# Patient Record
Sex: MALE | Race: WHITE | NOT HISPANIC OR LATINO | Employment: FULL TIME | URBAN - METROPOLITAN AREA
[De-identification: names, ages, dates, MRNs, and addresses within clinical notes are randomized per-mention and may not be internally consistent; named-entity substitution may affect disease eponyms.]

---

## 2018-05-02 ENCOUNTER — OFFICE VISIT CONVERTED (OUTPATIENT)
Dept: FAMILY MEDICINE CLINIC | Facility: CLINIC | Age: 33
End: 2018-05-02
Attending: NURSE PRACTITIONER

## 2018-05-15 ENCOUNTER — OFFICE VISIT CONVERTED (OUTPATIENT)
Dept: NEUROSURGERY | Facility: CLINIC | Age: 33
End: 2018-05-15
Attending: PHYSICIAN ASSISTANT

## 2021-05-16 VITALS
BODY MASS INDEX: 26.64 KG/M2 | WEIGHT: 201 LBS | SYSTOLIC BLOOD PRESSURE: 144 MMHG | HEART RATE: 73 BPM | DIASTOLIC BLOOD PRESSURE: 73 MMHG | HEIGHT: 73 IN

## 2021-05-16 VITALS
WEIGHT: 200.37 LBS | BODY MASS INDEX: 26.56 KG/M2 | HEART RATE: 55 BPM | HEIGHT: 73 IN | OXYGEN SATURATION: 97 % | SYSTOLIC BLOOD PRESSURE: 145 MMHG | DIASTOLIC BLOOD PRESSURE: 88 MMHG

## 2022-08-15 ENCOUNTER — APPOINTMENT (OUTPATIENT)
Dept: CT IMAGING | Facility: HOSPITAL | Age: 37
End: 2022-08-15

## 2022-08-15 ENCOUNTER — HOSPITAL ENCOUNTER (EMERGENCY)
Facility: HOSPITAL | Age: 37
Discharge: HOME | End: 2022-08-15
Attending: EMERGENCY MEDICINE | Admitting: EMERGENCY MEDICINE

## 2022-08-15 ENCOUNTER — APPOINTMENT (OUTPATIENT)
Dept: GENERAL RADIOLOGY | Facility: HOSPITAL | Age: 37
End: 2022-08-15

## 2022-08-15 VITALS
TEMPERATURE: 98 F | OXYGEN SATURATION: 95 % | HEART RATE: 73 BPM | SYSTOLIC BLOOD PRESSURE: 123 MMHG | DIASTOLIC BLOOD PRESSURE: 79 MMHG | RESPIRATION RATE: 16 BRPM

## 2022-08-15 DIAGNOSIS — S20.211A CONTUSION OF RIGHT CHEST WALL, INITIAL ENCOUNTER: Primary | ICD-10-CM

## 2022-08-15 DIAGNOSIS — S22.31XA CLOSED FRACTURE OF ONE RIB OF RIGHT SIDE, INITIAL ENCOUNTER: ICD-10-CM

## 2022-08-15 DIAGNOSIS — S46.911A STRAIN OF RIGHT ELBOW, INITIAL ENCOUNTER: ICD-10-CM

## 2022-08-15 LAB
HOLD SPECIMEN: NORMAL
WHOLE BLOOD HOLD COAG: NORMAL
WHOLE BLOOD HOLD SPECIMEN: NORMAL

## 2022-08-15 PROCEDURE — 74176 CT ABD & PELVIS W/O CONTRAST: CPT

## 2022-08-15 PROCEDURE — 71250 CT THORAX DX C-: CPT

## 2022-08-15 PROCEDURE — 25010000002 ONDANSETRON PER 1 MG: Performed by: EMERGENCY MEDICINE

## 2022-08-15 PROCEDURE — 73070 X-RAY EXAM OF ELBOW: CPT

## 2022-08-15 PROCEDURE — 96374 THER/PROPH/DIAG INJ IV PUSH: CPT

## 2022-08-15 PROCEDURE — 25010000002 HYDROMORPHONE PER 4 MG: Performed by: EMERGENCY MEDICINE

## 2022-08-15 PROCEDURE — 99284 EMERGENCY DEPT VISIT MOD MDM: CPT

## 2022-08-15 PROCEDURE — 96375 TX/PRO/DX INJ NEW DRUG ADDON: CPT

## 2022-08-15 PROCEDURE — 73060 X-RAY EXAM OF HUMERUS: CPT

## 2022-08-15 RX ORDER — OXYCODONE HYDROCHLORIDE AND ACETAMINOPHEN 5; 325 MG/1; MG/1
1 TABLET ORAL ONCE
Status: COMPLETED | OUTPATIENT
Start: 2022-08-15 | End: 2022-08-15

## 2022-08-15 RX ORDER — ONDANSETRON 4 MG/1
4 TABLET, ORALLY DISINTEGRATING ORAL 4 TIMES DAILY PRN
Qty: 15 TABLET | Refills: 0 | Status: SHIPPED | OUTPATIENT
Start: 2022-08-15

## 2022-08-15 RX ORDER — OXYCODONE HYDROCHLORIDE AND ACETAMINOPHEN 5; 325 MG/1; MG/1
1 TABLET ORAL EVERY 6 HOURS PRN
Qty: 15 TABLET | Refills: 0 | Status: SHIPPED | OUTPATIENT
Start: 2022-08-15

## 2022-08-15 RX ORDER — HYDROMORPHONE HYDROCHLORIDE 1 MG/ML
0.5 INJECTION, SOLUTION INTRAMUSCULAR; INTRAVENOUS; SUBCUTANEOUS ONCE
Status: COMPLETED | OUTPATIENT
Start: 2022-08-15 | End: 2022-08-15

## 2022-08-15 RX ORDER — OXYCODONE HYDROCHLORIDE AND ACETAMINOPHEN 5; 325 MG/1; MG/1
1 TABLET ORAL EVERY 6 HOURS PRN
Qty: 15 TABLET | Refills: 0 | Status: SHIPPED | OUTPATIENT
Start: 2022-08-15 | End: 2022-08-15 | Stop reason: SDUPTHER

## 2022-08-15 RX ORDER — SODIUM CHLORIDE 0.9 % (FLUSH) 0.9 %
10 SYRINGE (ML) INJECTION AS NEEDED
Status: DISCONTINUED | OUTPATIENT
Start: 2022-08-15 | End: 2022-08-15 | Stop reason: HOSPADM

## 2022-08-15 RX ORDER — ONDANSETRON 2 MG/ML
4 INJECTION INTRAMUSCULAR; INTRAVENOUS ONCE
Status: COMPLETED | OUTPATIENT
Start: 2022-08-15 | End: 2022-08-15

## 2022-08-15 RX ADMIN — OXYCODONE AND ACETAMINOPHEN 1 TABLET: 5; 325 TABLET ORAL at 13:06

## 2022-08-15 RX ADMIN — ONDANSETRON 4 MG: 2 INJECTION INTRAMUSCULAR; INTRAVENOUS at 11:10

## 2022-08-15 RX ADMIN — HYDROMORPHONE HYDROCHLORIDE 0.5 MG: 1 INJECTION, SOLUTION INTRAMUSCULAR; INTRAVENOUS; SUBCUTANEOUS at 11:10

## 2022-08-15 RX ADMIN — Medication 10 ML: at 10:05

## 2022-08-15 NOTE — ED NOTES
Pt to ED from work s/p insulation fell on his R side, having pain to posterior R upper back, R elbow, and back of R upper arm, unsure of LOC but denies neck or head pain. No bruising noted to torso arm or back at this time. Denies CP. States may have been a significant amount of weight that fell on him     Pt wearing face mask during their stay in ER. This RN wore appropriate ppe while providing patient care.

## 2022-08-15 NOTE — ED PROVIDER NOTES
EMERGENCY DEPARTMENT ENCOUNTER    Room Number:  20/20  PCP: Provider, No Known  Historian: Patient  History Limited By: Nothing      HPI  Chief Complaint: Chest and abdominal injury  Context: Perez Vann is a 37 y.o. male who presents to the ED c/o chest and abdominal injury.  Patient states he works in [place].  States fiberglass fell as he was bent over.  Patient states it fell into his right rib cage and then he fell onto his right elbow.  Patient states he did not hit his head.  Has no headache neck pain vomiting.  Patient has no fevers.  Patient has no pain in his hips or lower extremities.      Location: Right chest and right abdomen.  Right elbow  Radiation: None  Character: Aching  Duration: 8:30 AM  Severity: Severe  Progression: Not improving  Aggravating Factors: Movement  Alleviating Factors: Remaining still        MEDICAL RECORD REVIEW    No prior records in [facility]          PAST MEDICAL HISTORY  Active Ambulatory Problems     Diagnosis Date Noted   • No Active Ambulatory Problems     Resolved Ambulatory Problems     Diagnosis Date Noted   • No Resolved Ambulatory Problems     Past Medical History:   Diagnosis Date   • Thoracic compression fracture (HCC)          PAST SURGICAL HISTORY  History reviewed. No pertinent surgical history.      FAMILY HISTORY  History reviewed. No pertinent family history.      SOCIAL HISTORY  Social History     Socioeconomic History   • Marital status:    Tobacco Use   • Smoking status: Unknown If Ever Smoked   Substance and Sexual Activity   • Alcohol use: Defer   • Drug use: Defer   • Sexual activity: Defer         ALLERGIES  Patient has no known allergies.        REVIEW OF SYSTEMS  Review of Systems   Constitutional: Negative for activity change, appetite change and fever.   HENT: Negative for congestion and sore throat.    Eyes: Negative.    Respiratory: Negative for cough and shortness of breath.    Cardiovascular: Positive for chest pain.  Negative for leg swelling.   Gastrointestinal: Positive for abdominal pain. Negative for diarrhea and vomiting.   Endocrine: Negative.    Genitourinary: Negative for decreased urine volume and dysuria.   Musculoskeletal: Positive for arthralgias. Negative for neck pain.   Skin: Negative for rash and wound.   Allergic/Immunologic: Negative.    Neurological: Negative for weakness, numbness and headaches.   Hematological: Negative.    Psychiatric/Behavioral: Negative.    All other systems reviewed and are negative.           PHYSICAL EXAM  ED Triage Vitals   Temp Heart Rate Resp BP SpO2   08/15/22 0916 08/15/22 0916 08/15/22 0916 08/15/22 0916 08/15/22 0916   98 °F (36.7 °C) 60 16 140/90 99 %      Temp src Heart Rate Source Patient Position BP Location FiO2 (%)   -- 08/15/22 1016 -- -- --    Monitor          Physical Exam  Vitals and nursing note reviewed.   Constitutional:       General: He is not in acute distress.  HENT:      Head: Normocephalic and atraumatic.   Eyes:      Pupils: Pupils are equal, round, and reactive to light.   Cardiovascular:      Rate and Rhythm: Normal rate and regular rhythm.      Heart sounds: Normal heart sounds.   Pulmonary:      Effort: Pulmonary effort is normal. No respiratory distress.      Breath sounds: Normal breath sounds.   Chest:      Chest wall: Tenderness present.   Abdominal:      Palpations: Abdomen is soft.      Tenderness: There is abdominal tenderness. There is no guarding or rebound.   Musculoskeletal:         General: Normal range of motion.      Cervical back: Normal range of motion and neck supple.      Comments: Tenderness to right distal humerus and elbow   Skin:     General: Skin is warm and dry.   Neurological:      Mental Status: He is alert and oriented to person, place, and time.      Sensory: Sensation is intact. No sensory deficit.      Motor: No weakness.   Psychiatric:         Mood and Affect: Mood and affect normal.       Patient was wearing a face mask  when I entered the room and they continued to wear a mask throughout their stay in the ED.  I wore PPE, including a gown, gloves, face mask with shield or face mask with goggles whenever I was in the room with patient. My scribe was not in the room.      LAB RESULTS  Recent Results (from the past 24 hour(s))   Green Top (Gel)    Collection Time: 08/15/22 10:05 AM   Result Value Ref Range    Extra Tube Hold for add-ons.    Lavender Top    Collection Time: 08/15/22 10:05 AM   Result Value Ref Range    Extra Tube hold for add-on    Light Blue Top    Collection Time: 08/15/22 10:05 AM   Result Value Ref Range    Extra Tube Hold for add-ons.        Ordered the above labs and reviewed the results.        RADIOLOGY  CT Chest Without Contrast Diagnostic   Final Result   Impression:   1.  Asymmetric swelling of the right pectoralis musculature suggestive   of underlying intramuscular contusion and/or hematoma which is overall   not well evaluated without intravenous contrast.   2.  Right 10th rib fracture posteriorly concerning for a nondisplaced   acute fracture. Subtle adjacent pleural thickening and/or trace effusion   is present. No pneumothorax is seen.   3.  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 Bilateral pars defects at L5-S1 without   significant listhesis at this time.   4.  0.9 cm pulmonary nodule within the right middle lobe with apparent   foci of calcification and surrounding satellite nodularity. While   findings are suggestive of evolving granulomatous disease, they remain   indeterminate and continued attention on follow-up with chest CT in 3   months is recommended to ensure stability and/or appropriate evolution.   5.  Area of tree-in-bud nodularity within the right middle lobe is   likely reactive and correlation with patient history is recommended to   exclude acute atypical pneumonia.   6.  Other findings as above.       This report was finalized on  8/15/2022 12:40 PM by Dr. Donald Reese M.D.          CT Abdomen Pelvis Without Contrast   Final Result   Impression:   1.  Asymmetric swelling of the right pectoralis musculature suggestive   of underlying intramuscular contusion and/or hematoma which is overall   not well evaluated without intravenous contrast.   2.  Right 10th rib fracture posteriorly concerning for a nondisplaced   acute fracture. Subtle adjacent pleural thickening and/or trace effusion   is present. No pneumothorax is seen.   3.  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 Bilateral pars defects at L5-S1 without   significant listhesis at this time.   4.  0.9 cm pulmonary nodule within the right middle lobe with apparent   foci of calcification and surrounding satellite nodularity. While   findings are suggestive of evolving granulomatous disease, they remain   indeterminate and continued attention on follow-up with chest CT in 3   months is recommended to ensure stability and/or appropriate evolution.   5.  Area of tree-in-bud nodularity within the right middle lobe is   likely reactive and correlation with patient history is recommended to   exclude acute atypical pneumonia.   6.  Other findings as above.       This report was finalized on 8/15/2022 12:40 PM by Dr. Donald Reese M.D.          XR Humerus Right   Final Result      XR Elbow 2 View Right   Final Result           Ordered the above noted radiological studies. Reviewed by me in PACS.           PROCEDURES  Procedures              MEDICATIONS GIVEN IN ER  Medications   sodium chloride 0.9 % flush 10 mL (10 mL Intravenous Given 8/15/22 1005)   HYDROmorphone (DILAUDID) injection 0.5 mg (0.5 mg Intravenous Given 8/15/22 1110)   ondansetron (ZOFRAN) injection 4 mg (4 mg Intravenous Given 8/15/22 1110)   oxyCODONE-acetaminophen (PERCOCET) 5-325 MG per tablet 1 tablet (1 tablet Oral Given 8/15/22 1306)             PROGRESS AND  CONSULTS  ED Course as of 08/15/22 1423   Mon Aug 15, 2022   1253 12:53 EDT  Patient presents for evaluation after injury.  Patient's elbow does not appear broken.  He is tender to there may be soft tissue injury.  Will place in a sling and will refer to orthopedic.  Patient's chest and abdomen are from a chest wall contusion as well as 10th rib fracture.  Patient will give incentive spirometer.  Will be given pain medication.  He is to follow-up with Workmen's Comp.  Instructed to return here for worsening symptoms. [SL]      ED Course User Index  [SL] Tu Hamilton MD           MEDICAL DECISION MAKING      MDM  Number of Diagnoses or Management Options     Amount and/or Complexity of Data Reviewed  Tests in the radiology section of CPT®: reviewed and ordered (CT chest shows rib fracture)               DIAGNOSIS  Final diagnoses:   Contusion of right chest wall, initial encounter   Strain of right elbow, initial encounter   Closed fracture of one rib of right side, initial encounter           DISPOSITION  DISCHARGE    Patient discharged in stable condition.    Reviewed implications of results, diagnosis, meds, responsibility to follow up, warning signs and symptoms of possible worsening, potential complications and reasons to return to ER, including worsening symptoms    Patient/Family voiced understanding of above instructions.    Discussed plan for discharge, as there is no emergent indication for admission. Patient referred to primary care provider for BP management due to today's BP. Pt/family is agreeable and understands need for follow up and repeat testing.  Pt is aware that discharge does not mean that nothing is wrong but it indicates no emergency is present that requires admission and they must continue care with follow-up as given below or physician of their choice.     FOLLOW-UP  PATIENT CONNECTION - [facility] [id number]  [phone]  Schedule an appointment as soon as possible  for a visit       Wander Shah MD  [address] [facility] [address]  [place] [id number] [phone]    Schedule an appointment as soon as possible for a visit            Medication List      New Prescriptions    ondansetron ODT 4 MG disintegrating tablet  Commonly known as: ZOFRAN-ODT  Place 1 tablet on the tongue 4 (Four) Times a Day As Needed for Nausea or Vomiting.     oxyCODONE-acetaminophen 5-325 MG per tablet  Commonly known as: PERCOCET  Take 1 tablet by mouth Every 6 (Six) Hours As Needed for Moderate Pain .           Where to Get Your Medications      These medications were sent to [facility] - [place], [place] - [address] AT [facility] [address] - [phone]  - [phone] FX  [address], [place] [address]    Phone: [phone]   · oxyCODONE-acetaminophen 5-325 MG per tablet     You can get these medications from any pharmacy    Bring a paper prescription for each of these medications  · ondansetron ODT 4 MG disintegrating tablet             Latest Documented Vital Signs:  As of 14:23 EDT  BP- 123/79 HR- 73 Temp- 98 °F (36.7 °C) O2 sat- 95%                         Tu Hamilton MD  08/15/22 [id number]

## 2022-08-15 NOTE — ED NOTES
Patient was at work today and had some insulation fall on him. He is having pain on his right side, on his right shoulder and side area. He states his elbow also hurts. Patient picked up by ems, has on  Mask and nurse has on proper ppe

## 2022-08-15 NOTE — ED NOTES
Pt verbalized understanding and was able to demonstrate success use of incentive spirometer at this time. Pt denies any questions and educated on importance of use after incentive spirometer.

## 2023-03-27 ENCOUNTER — HOSPITAL ENCOUNTER (EMERGENCY)
Facility: HOSPITAL | Age: 38
Discharge: LEFT BEFORE DISCHARGE | End: 2023-03-27
Admitting: EMERGENCY MEDICINE
Payer: COMMERCIAL

## 2023-03-27 VITALS
WEIGHT: 218.48 LBS | HEIGHT: 73 IN | HEART RATE: 74 BPM | RESPIRATION RATE: 16 BRPM | OXYGEN SATURATION: 96 % | SYSTOLIC BLOOD PRESSURE: 141 MMHG | DIASTOLIC BLOOD PRESSURE: 89 MMHG | BODY MASS INDEX: 28.96 KG/M2 | TEMPERATURE: 98.1 F

## 2023-03-27 PROCEDURE — 99281 EMR DPT VST MAYX REQ PHY/QHP: CPT

## 2023-04-04 NOTE — ED PROVIDER NOTES
"Time: 7:05 AM EDT  Date of encounter:  3/27/2023  Independent Historian/Clinical History and Information was obtained by:   Patient  Chief Complaint   Patient presents with   • Laceration       History is limited by: N/A    History of Present Illness:  Patient is a 37 y.o. year old male who presents to the emergency department for evaluation of a laceration to his left eyebrow after hitting himself with a wooden post.  He did not lose consciousness.  There is no active bleeding.  This occurred 1 hour ago and the patient used liquid Band-Aid.    HPI    Patient Care Team  Primary Care Provider: Provider, Gege Known    Past Medical History:     No Known Allergies  Past Medical History:   Diagnosis Date   • Thoracic compression fracture     \"birth defect, looks like its crushed\"     No past surgical history on file.  No family history on file.    Home Medications:  Prior to Admission medications    Medication Sig Start Date End Date Taking? Authorizing Provider   IBUPROFEN IB PO Take  by mouth.    Provider, MD Gladys   ondansetron ODT (ZOFRAN-ODT) 4 MG disintegrating tablet Place 1 tablet on the tongue 4 (Four) Times a Day As Needed for Nausea or Vomiting. 8/15/22   Tu Hamilton MD   oxyCODONE-acetaminophen (PERCOCET) 5-325 MG per tablet Take 1 tablet by mouth Every 6 (Six) Hours As Needed for Moderate Pain . 8/15/22   Tu Hamilton MD        Social History:   Social History     Tobacco Use   • Smoking status: Unknown   Substance Use Topics   • Alcohol use: Defer   • Drug use: Defer         Review of Systems:  Review of Systems   Skin: Positive for wound.        Physical Exam:  /89 (Patient Position: Sitting)   Pulse 74   Temp 98.1 °F (36.7 °C) (Oral)   Resp 16   Ht 185.4 cm (73\")   Wt 99.1 kg (218 lb 7.6 oz)   SpO2 96%   BMI 28.82 kg/m²     Physical Exam  HENT:      Head: Laceration present.                    Procedures:  Procedures      Medical Decision Making:      Comorbidities that " affect care:    None    External Notes reviewed:    None      The following orders were placed and all results were independently analyzed by me:  No orders of the defined types were placed in this encounter.      Medications Given in the Emergency Department:  Medications - No data to display     ED Course:    The patient was initially evaluated in the triage area where orders were placed. The patient was later dispositioned by CARINA Singh.      The patient was advised to stay for completion of workup which includes but is not limited to communication of labs and radiological results, reassessment and plan. The patient was advised that leaving prior to disposition by a provider could result in critical findings that are not communicated to the patient.          Labs:    Lab Results (last 24 hours)     ** No results found for the last 24 hours. **           Imaging:    No Radiology Exams Resulted Within Past 24 Hours      Differential Diagnosis and Discussion:      Laceration: Laceration was evaluated for arterial injury, ligamentous damage, and other neurovascular injury.        MDM         Patient Care Considerations:    CT HEAD: I considered ordering a noncontrast CT of the head, however It was not indicated.      Consultants/Shared Management Plan:    None    Social Determinants of Health:    Patient is independent, reliable, and has access to care.       Disposition and Care Coordination:    Eloped: This patient has left the emergency department or waiting room with no communication to myself, nursing or administrative staff. There was no opportunity to discuss the patient's decision to leave, provide medical advice or discuss alternatives to. The staff has made efforts to locate patient without success.        Final diagnoses:   None        ED Disposition     ED Disposition   Eloped    Condition   --    Comment   --             This medical record created using voice recognition software.            Jennifer Bright, APRN  04/04/23 0708

## 2023-05-09 ENCOUNTER — OFFICE VISIT (OUTPATIENT)
Dept: UROLOGY | Facility: CLINIC | Age: 38
End: 2023-05-09
Payer: MEDICAID

## 2023-05-09 VITALS — BODY MASS INDEX: 28.89 KG/M2 | HEIGHT: 73 IN | WEIGHT: 218 LBS

## 2023-05-09 DIAGNOSIS — Z30.09 STERILIZATION CONSULT: Primary | ICD-10-CM

## 2023-05-09 NOTE — PROGRESS NOTES
Chief Complaint: Undesired fertility         History of Present Illness:  Perez Vann is a 37 y.o. male presents for counseling regarding vasectomy for permanent sterilization. The procedure was discussed with the patient. Perez Vann is aware that the procedure should be considered irreversible, although at times it can be reversed with success. Risks for the procedure including local effects of swelling, bleeding, pain, the possibility for recanalization, and continued fertility is also possible. Formation of a sperm granuloma also is a possibility. We discussed the procedure, preoperative preparation, and postoperative care. We also discussed the importance of continuing to use contraception post vasectomy, since the patient will not be sterile at that point. We stressed the importance of continuing to use contraception until a follow-up semen specimen is done that shows the absence of sperm. That specimen will normally be obtained eight weeks post-surgery. Perez Vann is voluntarily requesting the procedure and understands that if it is successful he will be unable to father children.     No anticoagulation, no previous scrotal surgery, no cardiopulmonary history    Alert and oriented x3  No acute distress  Unlabored respirations  Nontender/nondistended  Normal circumcised phallus, bilateral descended testicles without mass.  Bilateral vas deferens palpable  Grossly oriented to person place and time judgment is intact      Assessment and Plan      Consult for sterilization      Plan    Instructions  • The patient will schedule a vasectomy if he desires.   • Handouts were provided, vasectomy  scanned into the EMR for reference.   • Vasectomy is intended to be a permanent form of contraception.   • Vasectomy does not produce immediate sterility.   • Following vasectomy, another form of contraception is required until vas occlusion is confirmed by post-vasectomy semen analysis  (PVSA).   • Even after vas occlusion is confirmed, vasectomy is not 100% reliable in preventing pregnancy.   • The risk of pregnancy after vasectomy is approximately 1 in 2,000 for men who have no sperm in the semen on post-vasectomy semen analysis showing rare non-motile sperm (RNMS).   • Repeat vasectomy is necessary in <1% of vasectomies, provided that a technique for vas occlusion known to have low occlusive failure rate has been used.   • Patient's should refrain from ejaculation for approximately 1 week after vasectomy.   • Options for fertility after vasectomy reversal and sperm retrieval with in vitro fertilization. These options are not always successful, and are very expensive.   • The rates of surgical complications such as symptomatic hematoma and infection are 1-2%  • Chronic scrotal pain associated with negative impact on quality of life occurs after vasectomy in about 1-2% of men. Few of these men require additional surgery.   • Other permanent and non-permanent alternatives to vasectomy are available.  • Patient voiced understanding of the above statements.   • Electronically identified patient education materials provided electronically    Patient has decided to schedule a vasectomy.

## 2023-05-26 ENCOUNTER — PROCEDURE VISIT (OUTPATIENT)
Dept: UROLOGY | Facility: CLINIC | Age: 38
End: 2023-05-26

## 2023-05-26 DIAGNOSIS — Z30.2 STERILIZATION: Primary | ICD-10-CM

## 2023-05-26 NOTE — PROGRESS NOTES
Vasectomy Procedure    Procedure: Vasectomy     Pre-procedure Diagnosis: Undesired Fertility    Post-procedure Diagnosis: Same    Surgeon: Geoff Gutierrez MD    Anesthesia: Local, 10 cc of 1% Lidocaine    Indications  with undesired fertility, who presents today for vasectomy for permanent contraception.     Procedure Details:     The patient was appropriately identified, brought into the procedure room, and placed supine on the procedure table. A time was undertaken documenting the correct patient, site and procedure. His scrotum was prepped and draped in the standard sterile fashion. We first approached the right vas deferens. This was identified,  from the spermatic cord structures, and brought to the anterolateral aspect of the hemiscrotum. The local anaesthetic solution was injected and once an adequate level of local anesthesia was achieved, a scalpel was used to make a 1 cm incision in the skin overlying the vas deferens at the midline. A sharp hemostat was used to dissect the level of the vas deferens and on both of its sides, allowing for ring forceps to be introduced and grasp the vas deferens and externalize it through the skin incision. We then used a combination of sharp and blunt dissection to release the exposed vasal segment from all surrounding tissues. An approximately 1 cm piece of vas deferens was then sharply excised and removed. One blade of a sharp hemostat was used to probe each end of the severed vas deferens, confirming the presence of a vasal lumen. Electrocautery was then used to fulgurate both cut surfaces of the severed vas deferens. The abdominal side of the vas deferens was then placed underneath some perivasal tissues that were closed above it, using a figure-of-eight 3-0 chromic stitch, thus placing the two edges of the severed vas deferens in different tissue planes. Hemostasis was confirmed and the severed vas deferens was allowed to drop back into the scrotum.  We then  turned our attention to the left vas deferens. This was also identified and brought under the same midline incision. Local anesthetic was then delivered on this side around the vas deferens. An identical procedure was then carried out on the left vas deferens. Wound was dressed with bacitracin ointment and folded 4x4 gauze. The patient tolerated the procedure well and there were no intraoperative or immediate postoperative complications.     No intraprocedural complications    Blood loss 000    Plan:    1. Continue contraception until negative sperm analysis. Semen count in 10 weeks  2. Warning signs of infection were reviewed.   3. Patient is taken home by  with written home care instructions.  • Bedrest X 48 hrs, Ice pack every 3 hours for 24 hrs.    • Call the clinic if excessive pain, bleeding or swelling.  • Light duty for 2 weeks    Patient voiced understanding.    Electronically Signed by Geoff Gutierrez MD on 05/26/2023

## 2023-06-05 ENCOUNTER — TELEPHONE (OUTPATIENT)
Dept: UROLOGY | Facility: CLINIC | Age: 38
End: 2023-06-05

## 2023-06-05 DIAGNOSIS — T81.40XS POSTOPERATIVE INFECTION, UNSPECIFIED TYPE, SEQUELA: Primary | ICD-10-CM

## 2023-06-05 RX ORDER — SULFAMETHOXAZOLE AND TRIMETHOPRIM 800; 160 MG/1; MG/1
1 TABLET ORAL 2 TIMES DAILY
Qty: 20 TABLET | Refills: 0 | Status: SHIPPED | OUTPATIENT
Start: 2023-06-05 | End: 2023-06-15

## 2023-06-05 NOTE — TELEPHONE ENCOUNTER
CALLED AND SPOKE WITH PATIENT. HE IS AGREEABLE FOR ANTIBIOTICS. HE WILL CALL IF NO IMPROVEMENT BY THURS.

## 2023-06-05 NOTE — TELEPHONE ENCOUNTER
PATIENT HAD A VASECTOMY ON 05/26/23.  HE CALLED AND SAID HE IS CONCERNED THAT IT IS NOT HEALING OR CLOSING, AND THAT A STITCH HAS COME OUT.  HE SAID HE IS PUTTING NEOSPORIN ON IT TWICE A DAY.  HE DOESN'T KNOW IF IT HAS DRAINAGE COMING FROM IT, OR IF IT IS THE NEOSPORIN COMING OUT THAT HE SEES. HE IS CONCERNED IT MIGHT BE GETTING INFECTED.  MAYBE A LITTLE INFLAMED. HE SAID IT IN NOT PAINFUL. HE SAID DR. HALL MIGHT WANT TO LOOK AT IT.

## 2023-08-08 ENCOUNTER — TELEPHONE (OUTPATIENT)
Dept: UROLOGY | Facility: CLINIC | Age: 38
End: 2023-08-08

## 2023-08-08 ENCOUNTER — OFFICE VISIT (OUTPATIENT)
Dept: UROLOGY | Facility: CLINIC | Age: 38
End: 2023-08-08
Payer: MEDICAID

## 2023-08-08 DIAGNOSIS — Z30.2 STERILIZATION: Primary | ICD-10-CM

## 2023-08-08 PROCEDURE — 99024 POSTOP FOLLOW-UP VISIT: CPT | Performed by: UROLOGY

## 2023-08-08 NOTE — PROGRESS NOTES
Subjective         History of Present Illness:  Perez Vann is a 38 y.o. male who is here status post vasectomy. 0 sperm noted on a #20 power fields.         Assessment and Plan     Undesired fertility    Medications  Medications have been reconciled.       Instructions    [x]   Instructed patient to follow-up as needed, counseled that he is okay to stop using birth control.    []   Patient to follow-up in 1 month for recheck, patient counseled to continue use birth control as he is still considered fertile and able to father children until recheck and given the okay to stop using birth control at that time. Patient voiced understanding.         Geoff Gutierrez MD

## 2023-08-08 NOTE — TELEPHONE ENCOUNTER
Provider: DR HALL  Caller: ILIA WILEY  Relationship to Patient: SELF  Reason for Call: SAME DAY CANCEL -PT RUNNING LATE COMING BACK FROM [place] AND CANNOT MAKE APPT TIME.    ADVISED PT -PER  HE CAN DROP OFF SAMEPLE BY 2:3PM AS LONG AS WITHIN 2HRS FROM COLLECTION TIME.  PT ADVISED HE WILL BRING SAMPLE.